# Patient Record
Sex: FEMALE
[De-identification: names, ages, dates, MRNs, and addresses within clinical notes are randomized per-mention and may not be internally consistent; named-entity substitution may affect disease eponyms.]

---

## 2021-11-07 ENCOUNTER — FORM ENCOUNTER (OUTPATIENT)
Age: 61
End: 2021-11-07

## 2022-05-10 PROBLEM — Z00.00 ENCOUNTER FOR PREVENTIVE HEALTH EXAMINATION: Status: ACTIVE | Noted: 2022-05-10

## 2022-05-13 ENCOUNTER — APPOINTMENT (OUTPATIENT)
Dept: ORTHOPEDIC SURGERY | Facility: CLINIC | Age: 62
End: 2022-05-13
Payer: COMMERCIAL

## 2022-05-13 VITALS
TEMPERATURE: 98.1 F | HEIGHT: 60 IN | SYSTOLIC BLOOD PRESSURE: 130 MMHG | HEART RATE: 61 BPM | DIASTOLIC BLOOD PRESSURE: 80 MMHG | WEIGHT: 200 LBS | BODY MASS INDEX: 39.27 KG/M2 | OXYGEN SATURATION: 98 %

## 2022-05-13 DIAGNOSIS — Z72.3 LACK OF PHYSICAL EXERCISE: ICD-10-CM

## 2022-05-13 DIAGNOSIS — J45.909 UNSPECIFIED ASTHMA, UNCOMPLICATED: ICD-10-CM

## 2022-05-13 DIAGNOSIS — Z78.9 OTHER SPECIFIED HEALTH STATUS: ICD-10-CM

## 2022-05-13 DIAGNOSIS — Z83.3 FAMILY HISTORY OF DIABETES MELLITUS: ICD-10-CM

## 2022-05-13 DIAGNOSIS — Z87.39 OTHER SPECIFIED POSTPROCEDURAL STATES: ICD-10-CM

## 2022-05-13 DIAGNOSIS — Z82.49 FAMILY HISTORY OF ISCHEMIC HEART DISEASE AND OTHER DISEASES OF THE CIRCULATORY SYSTEM: ICD-10-CM

## 2022-05-13 DIAGNOSIS — K80.20 CALCULUS OF GALLBLADDER W/OUT CHOLECYSTITIS W/OUT OBSTRUCTION: ICD-10-CM

## 2022-05-13 DIAGNOSIS — Z98.890 OTHER SPECIFIED POSTPROCEDURAL STATES: ICD-10-CM

## 2022-05-13 PROCEDURE — 76882 US LMTD JT/FCL EVL NVASC XTR: CPT

## 2022-05-13 PROCEDURE — 99203 OFFICE O/P NEW LOW 30 MIN: CPT

## 2022-05-13 NOTE — DISCUSSION/SUMMARY
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  The majority of these are benign we can take this out.  Regardless her MRI is 6 months old and she thinks it is getting bigger so I would want to reimage it.  If it is much bigger then we would worry about possible malignancy and may need a biopsy first otherwise I would consider resection because of her symptoms.  She may need gabapentin preoperatively.  She understands there is risk of nerve injury including numbness and weakness into the hand.  This is right in the middle of the neurovascular structure as well as other vascular risks as well.  Follow-up after MRI scan.\par \par If imaging was ordered, the patient was told to make an appointment to review findings right after all imaging is completed.\par \par We discussed risks, benefits and alternatives. Rationale of care was reviewed and all questions were answered. Patient (and family) had all questions answered to her degree of the level of satisfaction. Patient (and family) expressed understanding and interest in proceeding with the plan as outlined.\par \par \par \par \par This note was done with a voice recognition transcription software and any typos are related to this rather than medical error. Surgical risks reviewed. Patient (and family) had all questions answered to an agreeable level of satisfaction. Patient (and family) expressed understanding and interest in proceeding with the plan as outlined.  \par

## 2022-05-13 NOTE — PHYSICAL EXAM
[FreeTextEntry1] : On exam the patient stands in good balance.  She is able to move around with full range of motion.  She does not have any palpable or elicitable weakness in the area.  She does have a palpable mass on the medial side of her right elbow just above the elbow itself on the medial epicondyle.  There is a pulse in this area but the mass itself is not pulsatile.  There is no obvious Tinel's in the area without it radiating distally.  She has no lymphadenopathy proximally into the axilla.  She has no other masses felt. [General Appearance - Well-Appearing] : Well appearing [Oriented To Time, Place, And Person] : Oriented to person, place, and time [Impaired Insight] : Insight and judgment were intact [Affect] : The affect was normal. [Mood] : the mood was normal [Sclera] : the sclera and conjunctiva were normal [Neck Cervical Mass (___cm)] : no neck mass was observed [Heart Rate And Rhythm] : heart rate was normal and rhythm regular [] : No respiratory distress [Abdomen Soft] : Soft [Normal Station and Gait] : gait and station were normal [Tenderness] : tenderness [Masses] : masses [Skin Changes - Describe changes:] : No skin changes noted [Full UE ROM unless otherwise noted:] : Full range of motion unless otherwise noted. [UE Motor Strength Normal unless otherwise noted:] : 5/5 strength in bilateral upper extremities unless otherwise noted. [Normal] : Sensation intact to light touch.

## 2022-05-13 NOTE — HISTORY OF PRESENT ILLNESS
[FreeTextEntry1] : This is 61 years old female who is complaining of right elbow mass with pain as well as right-sided pain coming from her head and neck down to her hip.  She also has pain in her back.  She was being investigated by pain management doctor and was found to have a palpable mass along the medial right elbow.  It is tender and so she was sent to me for further evaluation after imaging.  She thinks it has been there for about a year and maybe has been growing.  She has no other masses anywhere else in her body no history of any problems.  She says sometimes her hand feels weak coming from the shoulder all the way down.  She does not know whether she had imaging of her neck. [Worsening] : worsening [___ mths] : [unfilled] month(s) ago [3] : currently ~his/her~ pain is 3 out of 10 [Bending] : worsened by bending [Direct Pressure] : worsened by direct pressure [Joint Movement] : worsened by joint movement

## 2022-05-13 NOTE — DATA REVIEWED
[Imaging Present] : Present [de-identified] : MRI scan from November 8, 2021 shows a 2.3 x 1.4 x 1.5 cm solid nodule at the neurovascular bundle at the level of the distal humerus.  This is intensely homogeneously enhancing.  There are no other lesions seen.  This seems most likely consistent with a nerve sheath tumor.\par \par Focused ultrasound of the area similarly shows the 2.7 cm mass in the area of concern.  The vessels are draped directly over it and there is no internal flow in it.

## 2022-06-01 ENCOUNTER — APPOINTMENT (OUTPATIENT)
Dept: MRI IMAGING | Facility: CLINIC | Age: 62
End: 2022-06-01
Payer: COMMERCIAL

## 2022-06-01 ENCOUNTER — RESULT REVIEW (OUTPATIENT)
Age: 62
End: 2022-06-01

## 2022-06-01 ENCOUNTER — OUTPATIENT (OUTPATIENT)
Dept: OUTPATIENT SERVICES | Facility: HOSPITAL | Age: 62
LOS: 1 days | End: 2022-06-01

## 2022-06-01 PROCEDURE — 73223 MRI JOINT UPR EXTR W/O&W/DYE: CPT | Mod: 26,RT

## 2022-06-24 ENCOUNTER — APPOINTMENT (OUTPATIENT)
Dept: ORTHOPEDIC SURGERY | Facility: CLINIC | Age: 62
End: 2022-06-24
Payer: COMMERCIAL

## 2022-06-24 VITALS
SYSTOLIC BLOOD PRESSURE: 122 MMHG | HEART RATE: 70 BPM | HEIGHT: 60 IN | BODY MASS INDEX: 39.27 KG/M2 | OXYGEN SATURATION: 97 % | WEIGHT: 200 LBS | DIASTOLIC BLOOD PRESSURE: 82 MMHG

## 2022-06-24 PROCEDURE — 99214 OFFICE O/P EST MOD 30 MIN: CPT

## 2022-06-24 NOTE — DATA REVIEWED
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

## 2022-06-24 NOTE — PHYSICAL EXAM
[General Appearance - Well-Appearing] : Well appearing [Oriented To Time, Place, And Person] : Oriented to person, place, and time [Impaired Insight] : Insight and judgment were intact [Affect] : The affect was normal. [Mood] : the mood was normal [Sclera] : the sclera and conjunctiva were normal [Neck Cervical Mass (___cm)] : no neck mass was observed [Heart Rate And Rhythm] : heart rate was normal and rhythm regular [] : No respiratory distress [Abdomen Soft] : Soft [Normal Station and Gait] : gait and station were normal [Tenderness] : tenderness [Masses] : masses [Full UE ROM unless otherwise noted:] : Full range of motion unless otherwise noted. [UE Motor Strength Normal unless otherwise noted:] : 5/5 strength in bilateral upper extremities unless otherwise noted. [Normal] : Sensation intact to light touch. [FreeTextEntry1] : On exam the patient stands in good balance.  She is able to move around with full range of motion.  She does not have any palpable or elicitable weakness in the area.  She does have a palpable mass on the medial side of her right elbow just above the elbow itself on the medial epicondyle.  There is a pulse in this area but the mass itself is not pulsatile.  There is no obvious Tinel's in the area without it radiating distally.  She has no lymphadenopathy proximally into the axilla.  She has no other masses felt. [Skin Changes - Describe changes:] : No skin changes noted

## 2022-06-24 NOTE — DISCUSSION/SUMMARY
[Surgical risks reviewed] : Surgical risks reviewed [All Questions Answered] : Patient (and family) had all questions answered to an agreeable level of satisfaction [Interested in Proceeding] : Patient (and family) expressed understanding and interest in proceeding with the plan as outlined [de-identified] : Patient is thinking about surgery.  My recommendation for her is for exploration moving the vessels inside and seeing the mass.  If it is in the median nerve we are trying  as best as possible.  She understands there is a chance that there could be nerve injury.  I recommended gabapentin preoperatively as well.  We will plan her for an elective ambulatory resection with neurovascular dissection of the right arm mass.  Risk benefits discussed with her and her son.\par \par If imaging was ordered, the patient was told to make an appointment to review findings right after all imaging is completed.\par \par We discussed risks, benefits and alternatives. Rationale of care was reviewed and all questions were answered. Patient (and family) had all questions answered to her degree of the level of satisfaction. Patient (and family) expressed understanding and interest in proceeding with the plan as outlined.\par \par \par \par \par This note was done with a voice recognition transcription software and any typos are related to this rather than medical error. Surgical risks reviewed. Patient (and family) had all questions answered to an agreeable level of satisfaction. Patient (and family) expressed understanding and interest in proceeding with the plan as outlined.  \par

## 2022-06-24 NOTE — HISTORY OF PRESENT ILLNESS
[Stable] : stable [FreeTextEntry1] : Patient had an MRI a few weeks ago and is here to follow-up.  She is having the same pain as before.  Nothing much is changed.  She does not think it is bigger.

## 2022-07-21 ENCOUNTER — RX RENEWAL (OUTPATIENT)
Age: 62
End: 2022-07-21

## 2022-08-09 ENCOUNTER — APPOINTMENT (OUTPATIENT)
Dept: ORTHOPEDIC SURGERY | Facility: AMBULATORY MEDICAL SERVICES | Age: 62
End: 2022-08-09

## 2022-08-18 ENCOUNTER — RX RENEWAL (OUTPATIENT)
Age: 62
End: 2022-08-18

## 2022-08-18 RX ORDER — GABAPENTIN 300 MG/1
300 CAPSULE ORAL
Qty: 60 | Refills: 0 | Status: ACTIVE | COMMUNITY
Start: 2022-06-24 | End: 1900-01-01

## 2022-08-23 ENCOUNTER — APPOINTMENT (OUTPATIENT)
Dept: ORTHOPEDIC SURGERY | Facility: CLINIC | Age: 62
End: 2022-08-23

## 2022-08-23 ENCOUNTER — APPOINTMENT (OUTPATIENT)
Dept: ORTHOPEDIC SURGERY | Facility: AMBULATORY MEDICAL SERVICES | Age: 62
End: 2022-08-23

## 2022-08-23 PROCEDURE — 64708 REVISE ARM/LEG NERVE: CPT | Mod: RT

## 2022-08-23 PROCEDURE — 24066 BIOPSY ARM/ELBOW SOFT TISSUE: CPT | Mod: 59,RT

## 2022-08-23 PROCEDURE — 24077 RAD RESCJ TUM TISS A/E <5CM: CPT | Mod: RT

## 2022-09-01 ENCOUNTER — NON-APPOINTMENT (OUTPATIENT)
Age: 62
End: 2022-09-01

## 2022-09-06 ENCOUNTER — APPOINTMENT (OUTPATIENT)
Dept: ORTHOPEDIC SURGERY | Facility: CLINIC | Age: 62
End: 2022-09-06

## 2022-09-06 VITALS
DIASTOLIC BLOOD PRESSURE: 80 MMHG | BODY MASS INDEX: 39.27 KG/M2 | WEIGHT: 200 LBS | SYSTOLIC BLOOD PRESSURE: 120 MMHG | HEIGHT: 60 IN

## 2022-09-06 DIAGNOSIS — R22.31 LOCALIZED SWELLING, MASS AND LUMP, RIGHT UPPER LIMB: ICD-10-CM

## 2022-09-06 PROCEDURE — 99024 POSTOP FOLLOW-UP VISIT: CPT

## 2022-09-12 PROBLEM — R22.31 ELBOW MASS, RIGHT: Status: ACTIVE | Noted: 2022-05-13

## 2022-09-12 NOTE — HISTORY OF PRESENT ILLNESS
[___ Weeks Post Op] : [unfilled] weeks post op [Swelling] : not swollen [Vascular Intact] : ~T peripheral vascular exam normal [Negative Sunny's] : maneuvers demonstrated a negative Sunny's sign [Slow Progress] : is progressing slowly [Excellent Pain Control] : has excellent pain control [No Sign of Infection] : is showing no signs of infection [de-identified] : 8/23/2022 - R arm mass resection [de-identified] : Patient is doing very well.  She is not having significant pain in the area.  She is not having pain in her hand.  She has some mild paresthesias on the volar side of the hand and the first couple of digits.  She is otherwise moving well. [de-identified] : On exam her incision is clean dry and intact.  She has minimal tenderness in the area on the inside of the arm.  She has paresthesias as above.  She is able to move all of her fingers appropriately.  She has good radial and ulnar pulses. 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  I discussed with him that this is something that is likely best taken out once again in full.  Otherwise there is a chance of recurrence.  With her recent vascular repair I would hold off on any surgery for a few months.  She is can come back to me again in a few weeks we can check on her and assuming that she continues doing well I would get an MRI scan in 3 to 4 months. [de-identified] : Depending on what this looks like she may need reresection including vascular bypass at that point.  I am going to let everything settle down including the median nerve stretch.\par \par If imaging was ordered, the patient was told to make an appointment to review findings right after all imaging is completed.\par \par We discussed risks, benefits and alternatives. Rationale of care was reviewed and all questions were answered. Patient (and family) had all questions answered to her degree of the level of satisfaction. Patient (and family) expressed understanding and interest in proceeding with the plan as outlined.\par \par \par \par \par This note was done with a voice recognition transcription software and any typos are related to this rather than medical error. Surgical risks reviewed. Patient (and family) had all questions answered to an agreeable level of satisfaction. Patient (and family) expressed understanding and interest in proceeding with the plan as outlined.  \par

## 2022-10-14 ENCOUNTER — APPOINTMENT (OUTPATIENT)
Dept: ORTHOPEDIC SURGERY | Facility: CLINIC | Age: 62
End: 2022-10-14

## 2023-04-21 ENCOUNTER — APPOINTMENT (OUTPATIENT)
Dept: ORTHOPEDIC SURGERY | Facility: CLINIC | Age: 63
End: 2023-04-21
Payer: COMMERCIAL

## 2023-04-21 VITALS
WEIGHT: 200 LBS | SYSTOLIC BLOOD PRESSURE: 120 MMHG | HEART RATE: 83 BPM | HEIGHT: 60 IN | DIASTOLIC BLOOD PRESSURE: 76 MMHG | BODY MASS INDEX: 39.27 KG/M2 | OXYGEN SATURATION: 98 % | TEMPERATURE: 98.7 F

## 2023-04-21 DIAGNOSIS — D48.9 NEOPLASM OF UNCERTAIN BEHAVIOR, UNSPECIFIED: ICD-10-CM

## 2023-04-21 PROCEDURE — 99213 OFFICE O/P EST LOW 20 MIN: CPT

## 2023-04-21 NOTE — PHYSICAL EXAM
[FreeTextEntry1] : On exam her incision is clean dry and intact.  She has palpable radial and ulnar pulses.  Her hand is well-perfused.  She occasionally gets some paresthesias in the volar tips of thumb and index finger but rest the hand is intact.  She has excellent strength.  She is able to move her elbow appropriately.  She has no Tinel's.  She has no axillary lymphadenopathy [General Appearance - Well-Appearing] : Well appearing [General Appearance - Well Nourished] : well nourished [Oriented To Time, Place, And Person] : Oriented to person, place, and time [Impaired Insight] : Insight and judgment were intact [Affect] : The affect was normal. [Sclera] : the sclera and conjunctiva were normal [Mood] : the mood was normal [Neck Appearance] : The appearance of the neck was normal [Jugular Venous Distention Increased] : there was no jugular-venous distention [Heart Rate And Rhythm] : heart rate was normal and rhythm regular [Thyroid Nodule] : there were no thyroid nodules [] : No respiratory distress [Abdomen Soft] : Soft [Normal Station and Gait] : gait and station were normal [Tenderness] : tenderness [Masses] : no masses [Skin Changes - Describe changes:] : No skin changes noted [Full UE ROM unless otherwise noted:] : Full range of motion unless otherwise noted. [UE Motor Strength Normal unless otherwise noted:] : 5/5 strength in bilateral upper extremities unless otherwise noted. [Sensory Grossly Intact to light touch except for:] : Sensory Grossly Intact to light touch except for: ~M [Normal] : Sensation intact to light touch. [FreeTextEntry7] : as above

## 2023-04-21 NOTE — HISTORY OF PRESENT ILLNESS
[FreeTextEntry1] : Overall, patient has improved.  She does not have any more the sharp pain.  She still occasionally gets some numbness thumb and index fingertips.  She also occasionally gets tired but is able to move her shoulder elbow wrist and hand. [Stable] : stable [1] : currently ~his/her~ pain is 1 out of 10

## 2023-04-21 NOTE — DISCUSSION/SUMMARY
[All Questions Answered] : Patient (and family) had all questions answered to an agreeable level of satisfaction [Interested in Proceeding] : Patient (and family) expressed understanding and interest in proceeding with the plan as outlined [de-identified] : Patient is 8 months postop.  This type of pathology certainly can come back and he never took it out completely because of the vessels.  She understood that there was a chance that she may need new surgery however I would like to get a baseline of where we are now.  For this reason I am ordering an MRI.  If there is only minimal disease then we can watch this otherwise she may need further resection.  In any event based on MRI of the arm with and without contrast\par \par If imaging was ordered, the patient was told to make an appointment to review findings right after all imaging is completed.\par \par We discussed risks, benefits and alternatives. Rationale of care was reviewed and all questions were answered. Patient (and family) had all questions answered to her degree of the level of satisfaction. Patient (and family) expressed understanding and interest in proceeding with the plan as outlined.\par \par \par \par \par This note was done with a voice recognition transcription software and any typos are related to this rather than medical error. Surgical risks reviewed. Patient (and family) had all questions answered to an agreeable level of satisfaction. Patient (and family) expressed understanding and interest in proceeding with the plan as outlined.  \par

## 2023-04-21 NOTE — REASON FOR VISIT
[FreeTextEntry1] : 8/23/2022 -resection of the right arm mass epithelioid hemangioendothelioma with vessel reconstruction

## 2024-04-17 ENCOUNTER — TRANSCRIPTION ENCOUNTER (OUTPATIENT)
Age: 64
End: 2024-04-17

## 2024-08-13 ENCOUNTER — NON-APPOINTMENT (OUTPATIENT)
Age: 64
End: 2024-08-13

## 2024-08-30 ENCOUNTER — APPOINTMENT (OUTPATIENT)
Dept: ORTHOPEDIC SURGERY | Facility: CLINIC | Age: 64
End: 2024-08-30
Payer: COMMERCIAL

## 2024-08-30 DIAGNOSIS — R22.31 LOCALIZED SWELLING, MASS AND LUMP, RIGHT UPPER LIMB: ICD-10-CM

## 2024-08-30 DIAGNOSIS — D48.9 NEOPLASM OF UNCERTAIN BEHAVIOR, UNSPECIFIED: ICD-10-CM

## 2024-08-30 PROCEDURE — 99214 OFFICE O/P EST MOD 30 MIN: CPT

## 2024-08-30 PROCEDURE — 76882 US LMTD JT/FCL EVL NVASC XTR: CPT

## 2024-09-05 NOTE — DATA REVIEWED
[de-identified] : Focused US just above the right elbow in office today, 08/30/2024, shows the artery with some inflammation but no obvious new masses in that area.  Focused US of the right hand in office today, 08/30/2024,shows a possibly cystic round mass without flow just superficial to the tendons in the palm, 8 x 4 mm.

## 2024-09-05 NOTE — DISCUSSION/SUMMARY
[All Questions Answered] : Patient (and family) had all questions answered to an agreeable level of satisfaction [Interested in Proceeding] : Patient (and family) expressed understanding and interest in proceeding with the plan as outlined [de-identified] : Right hand mass distal to where she previously had an epithelioid hemangioendothelioma, resected two years ago. Because of her history, I would consider cystic etiology but this could be vascular. In any case, will get MRI w/wo contrast to further evaluate. She will follow up with me after MRI scan to determine whether resection is appropriate or not.  If imaging or pathology/biopsy was ordered, the patient was told to make an appointment to review findings right after all imaging is completed.   We discussed risks, benefits and alternatives. Rationale of care was reviewed and all questions were answered.

## 2024-09-05 NOTE — ADDENDUM
[FreeTextEntry1] : I, Tiburcio Wisdom, documented this note as a scribe on behalf of Dr. Alex Friend on 08/30/2024.

## 2024-09-05 NOTE — DATA REVIEWED
[de-identified] : Focused US just above the right elbow in office today, 08/30/2024, shows the artery with some inflammation but no obvious new masses in that area.  Focused US of the right hand in office today, 08/30/2024,shows a possibly cystic round mass without flow just superficial to the tendons in the palm, 8 x 4 mm.

## 2024-09-05 NOTE — HISTORY OF PRESENT ILLNESS
[2] : currently ~his/her~ pain is 2 out of 10 [FreeTextEntry1] : Patient presents for first visit since April 2023, with new complaints of an area of swelling in the right hand, present for the past two months, distal to her past excised mass. She denies any known trauma to the region. It is somewhat tender at times but generally not painful. There is no radiating pain.

## 2024-09-05 NOTE — DISCUSSION/SUMMARY
[All Questions Answered] : Patient (and family) had all questions answered to an agreeable level of satisfaction [Interested in Proceeding] : Patient (and family) expressed understanding and interest in proceeding with the plan as outlined [de-identified] : Right hand mass distal to where she previously had an epithelioid hemangioendothelioma, resected two years ago. Because of her history, I would consider cystic etiology but this could be vascular. In any case, will get MRI w/wo contrast to further evaluate. She will follow up with me after MRI scan to determine whether resection is appropriate or not.  If imaging or pathology/biopsy was ordered, the patient was told to make an appointment to review findings right after all imaging is completed.   We discussed risks, benefits and alternatives. Rationale of care was reviewed and all questions were answered.

## 2024-09-05 NOTE — END OF VISIT
[FreeTextEntry3] : All medical record entries made by the Scribe were at my, Dr. Alex Friend, direction and personally dictated by me on 08/30/2024. I have reviewed the chart and agree that the record accurately reflects my personal performance of the history, physical exam, assessment and plan. I have also personally directed, reviewed, and agreed with the chart.

## 2024-09-05 NOTE — PHYSICAL EXAM
[General Appearance - Well-Appearing] : Well appearing [General Appearance - Well Nourished] : well nourished [Oriented To Time, Place, And Person] : Oriented to person, place, and time [Sclera] : the sclera and conjunctiva were normal [Neck Cervical Mass (___cm)] : no neck mass was observed [Heart Rate And Rhythm] : heart rate was normal and rhythm regular [] : No respiratory distress [Abdomen Soft] : Soft [Normal Station and Gait] : gait and station were normal [FreeTextEntry1] : On exam there is an almost 1 cm mass in the area of the right palm around the 3rd ray proximal to the MCP joints volarly. It does not move with the fingers. There are no Tinel's. There is minimal bruit. There is no clicking or triggering. She has no epitrochlear or axillary lymph nodes.

## 2024-09-19 ENCOUNTER — APPOINTMENT (OUTPATIENT)
Dept: MRI IMAGING | Facility: CLINIC | Age: 64
End: 2024-09-19
Payer: COMMERCIAL

## 2024-09-19 PROCEDURE — 73220 MRI UPPR EXTREMITY W/O&W/DYE: CPT | Mod: 26,RT

## 2024-10-11 ENCOUNTER — NON-APPOINTMENT (OUTPATIENT)
Age: 64
End: 2024-10-11

## 2024-10-11 ENCOUNTER — APPOINTMENT (OUTPATIENT)
Dept: ORTHOPEDIC SURGERY | Facility: CLINIC | Age: 64
End: 2024-10-11

## 2024-10-11 PROCEDURE — 99214 OFFICE O/P EST MOD 30 MIN: CPT

## 2024-12-13 ENCOUNTER — APPOINTMENT (OUTPATIENT)
Dept: ORTHOPEDIC SURGERY | Facility: CLINIC | Age: 64
End: 2024-12-13
Payer: COMMERCIAL

## 2024-12-13 PROCEDURE — 99213 OFFICE O/P EST LOW 20 MIN: CPT

## 2024-12-13 PROCEDURE — 76882 US LMTD JT/FCL EVL NVASC XTR: CPT

## 2025-05-23 ENCOUNTER — APPOINTMENT (OUTPATIENT)
Dept: ORTHOPEDIC SURGERY | Facility: CLINIC | Age: 65
End: 2025-05-23

## 2025-07-11 ENCOUNTER — APPOINTMENT (OUTPATIENT)
Dept: ORTHOPEDIC SURGERY | Facility: CLINIC | Age: 65
End: 2025-07-11
Payer: COMMERCIAL

## 2025-07-11 ENCOUNTER — NON-APPOINTMENT (OUTPATIENT)
Age: 65
End: 2025-07-11

## 2025-07-11 VITALS
BODY MASS INDEX: 26.5 KG/M2 | OXYGEN SATURATION: 98 % | HEART RATE: 75 BPM | TEMPERATURE: 97.4 F | WEIGHT: 135 LBS | DIASTOLIC BLOOD PRESSURE: 72 MMHG | SYSTOLIC BLOOD PRESSURE: 112 MMHG | HEIGHT: 60 IN

## 2025-07-11 PROCEDURE — 99214 OFFICE O/P EST MOD 30 MIN: CPT | Mod: 25

## 2025-07-11 PROCEDURE — 76882 US LMTD JT/FCL EVL NVASC XTR: CPT | Mod: RT
